# Patient Record
Sex: MALE | Race: WHITE | NOT HISPANIC OR LATINO | Employment: FULL TIME | URBAN - NONMETROPOLITAN AREA
[De-identification: names, ages, dates, MRNs, and addresses within clinical notes are randomized per-mention and may not be internally consistent; named-entity substitution may affect disease eponyms.]

---

## 2017-06-05 ENCOUNTER — OCCUPATIONAL MEDICINE (OUTPATIENT)
Dept: URGENT CARE | Facility: PHYSICIAN GROUP | Age: 62
End: 2017-06-05
Payer: COMMERCIAL

## 2017-06-05 VITALS
HEIGHT: 71 IN | WEIGHT: 188 LBS | OXYGEN SATURATION: 98 % | RESPIRATION RATE: 14 BRPM | BODY MASS INDEX: 26.32 KG/M2 | SYSTOLIC BLOOD PRESSURE: 128 MMHG | HEART RATE: 68 BPM | TEMPERATURE: 98.4 F | DIASTOLIC BLOOD PRESSURE: 78 MMHG

## 2017-06-05 DIAGNOSIS — S66.912A STRAIN OF LEFT WRIST, INITIAL ENCOUNTER: ICD-10-CM

## 2017-06-05 PROCEDURE — 29130 APPL FINGER SPLINT STATIC: CPT | Performed by: FAMILY MEDICINE

## 2017-06-05 PROCEDURE — 99204 OFFICE O/P NEW MOD 45 MIN: CPT | Performed by: FAMILY MEDICINE

## 2017-06-05 ASSESSMENT — PAIN SCALES - GENERAL: PAINLEVEL: 5=MODERATE PAIN

## 2017-06-05 NOTE — Clinical Note
"EMPLOYEE’S CLAIM FOR COMPENSATION/ REPORT OF INITIAL TREATMENT  FORM C-4    EMPLOYEE’S CLAIM - PROVIDE ALL INFORMATION REQUESTED   First Name  Wagner Last Name  Rhina Birthdate                    1955                Sex  male Claim Number   Home Address  [address] Age  62 y.o. 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 (if applicable)  lifting furniture    How did this injury or occupational disease occur? (Be specific an answer in detail. Use additional sheet if necessary)  lifting furniture after cleaning carpet to put protective blocks under legs   If you believe that you have an occupational disease, when did you first have knowledge of the disability and it relationship to your employment?   Witnesses to the Accident  no      Nature of Injury or Occupational Disease  Workers' Compensation  Part(s) of Body Injured or Affected  Wrist (L) and Hand (L), ,     I certify that the above is true and correct to the best of my knowledge and that I have provided this information in order to obtain the benefits of [place] and " [place]  I hereby authorize any physician, chiropractor, surgeon, practitioner, or other person, any hospital, including [facility] or [facility], any medical service organization, any insurance company, or other institution or organization to release to each other, any medical or other information, including benefits paid or payable, pertinent to this injury or disease, except information relative to diagnosis, treatment and/or counseling for AIDS, psychological conditions, alcohol or controlled substances, for which I must give specific authorization.  A Photostat of this authorization shall be as valid as the original.     Date  6/5/2017   [facility]   Employee’s Signature   THIS REPORT MUST BE COMPLETED AND MAILED WITHIN 3 WORKING DAYS OF TREATMENT   [facility]  Name of [facility]   Date  6/5/2017 Diagnosis  (S66.912A) Strain of left wrist, initial encounter Is there evidence the injured employee was under the influence of alcohol and/or another controlled substance at the time of accident?   Hour  4:23 PM Description of Injury or Disease  The encounter diagnosis was Strain of left wrist, initial encounter. No   Treatment  Strain of left wrist, initial encounter  rx voltaren gel, since OTCs not helpful  Work restrictions per D39  Thumb spica splint  F/U 5-7 days  Have you advised the patient to remain off work five days or more? No   X-Ray Findings      If Yes   From Date  To Date      From information given by the employee, together with medical evidence, can you directly connect this injury or occupational disease as job incurred?  Yes If No Full Duty  No Modified Duty  Yes   Is additional medical care by a physician indicated?  Yes If Modified Duty, Specify any Limitations / Restrictions  Restrictions per D39     Do you know of any previous injury or disease  "contributing to this condition or occupational disease?                            No   Date  6/5/2017 Print Doctor’s Name Damián Fitch M.D. I certify the employer’s copy of  this form was mailed on:   Address  [address] Insurer’s Use Only     [place] Zip  [address]    Provider’s Tax ID Number  [id number]  Telephone  Dept: [phone]        e-DAMIÁN Bird M.D.   e-Signature: Dr. Kyle Bishop, Medical Director Degree  MD        ORIGINAL-TREATING PHYSICIAN OR CHIROPRACTOR    PAGE 2-INSURER/TPA    PAGE 3-EMPLOYER    PAGE 4-EMPLOYEE             Form C-4 (rev10/07)              BRIEF DESCRIPTION OF RIGHTS AND BENEFITS  (Pursuant to [place] 616C.050)    Notice of Injury or Occupational Disease (Incident Report Form C-1): If an injury or occupational disease (OD) arises out of and in the  course of employment, you must provide written notice to your employer as soon as practicable, but no later than 7 days after the accident or  OD. Your employer shall maintain a sufficient supply of the required forms.    Claim for Compensation (Form C-4): If medical treatment is sought, the form C-4 is available at the place of initial treatment. A completed  \"Claim for Compensation\" (Form C-4) must be filed within 90 days after an accident or OD. The treating physician or chiropractor must,  within 3 working days after treatment, complete and mail to the employer, the employer's insurer and third-party , the Claim for  Compensation.    Medical Treatment: If you require medical treatment for your on-the-job injury or OD, you may be required to select a physician or  chiropractor from a list provided by your workers’ compensation insurer, if it has contracted with an Organization for Managed Care (MCO) or  Preferred Provider Organization (PPO) or providers of health care. If your employer has not entered into a contract with an MCO or PPO, you  may select a physician or " chiropractor from the Panel of Physicians and Chiropractors. Any medical costs related to your industrial injury or  OD will be paid by your insurer.    Temporary Total Disability (TTD): If your doctor has certified that you are unable to work for a period of at least 5 consecutive days, or 5  cumulative days in a 20-day period, or places restrictions on you that your employer does not accommodate, you may be entitled to TTD  compensation.    Temporary Partial Disability (TPD): If the wage you receive upon reemployment is less than the compensation for TTD to which you are  entitled, the insurer may be required to pay you TPD compensation to make up the difference. TPD can only be paid for a maximum of 24  months.    Permanent Partial Disability (PPD): When your medical condition is stable and there is an indication of a PPD as a result of your injury or  OD, within 30 days, your insurer must arrange for an evaluation by a rating physician or chiropractor to determine the degree of your PPD. The  amount of your PPD award depends on the date of injury, the results of the PPD evaluation and your age and wage.    Permanent Total Disability (PTD): If you are medically certified by a treating physician or chiropractor as permanently and totally disabled  and have been granted a PTD status by your insurer, you are entitled to receive monthly benefits not to exceed 66 2/3% of your average  monthly wage. The amount of your PTD payments is subject to reduction if you previously received a PPD award.    Vocational Rehabilitation Services: You may be eligible for vocational rehabilitation services if you are unable to return to the job due to a  permanent physical impairment or permanent restrictions as a result of your injury or occupational disease.    Transportation and Per Jarvis Reimbursement: You may be eligible for travel expenses and per jarvis associated with medical treatment.    Reopening: You may be able to reopen your  claim if your condition worsens after claim closure.    Appeal Process: If you disagree with a written determination issued by the insurer or the insurer does not respond to your request, you may  appeal to the Department of Administration, , by following the instructions contained in your determination letter. You must  appeal the determination within 70 days from the date of the determination letter at [address], [address], [place], [place]  [address], or [address], [place], [place] [address]. If you disagree with the  decision, you may appeal to the  Department of Administration, . You must file your appeal within 30 days from the date of the  decision  letter at [address], [address], [place], [place] [address], or [address], [place], [place] [address]. If you  disagree with a decision of an , you may file a petition for judicial review with the District Court. You must do so within 30  days of the Appeal Officer’s decision. You may be represented by an  at your own expense or you may contact the [facility] for possible  representation.    [place]  for Injured Workers (NAIW): If you disagree with a  decision, you may request that NAIW represent you  without charge at an  Hearing. For information regarding denial of benefits, you may contact the [facility] at: [address], [address], [place] [address], [phone], or [address], [place] [address], [phone]    To File a Complaint with the Division: If you wish to file a complaint with the  of the Division of Industrial Relations (DIR),  please contact the Workers’ Compensation Section, [address], [address], [place], [place] [address], telephone [phone], or  [address], [place]  [address], telephone [phone].    For assistance with Workers’ Compensation Issues: you may contact the Office of the Governor Consumer Health Assistance, [address] [place] [address], [address], [place] [address], Toll Free 1-998.425.9168, Web site: http://[employer].[facility].nv., E-mail  Za@[facility].[email or web address].                                                                                                                                                                                                                                   __________________________________________________________________                                                                   _____6/5/2017_____                Employee Name / Signature                                                                                                                                                       Date                                                                                                                                                                                                     D-2 (rev. 10/07)

## 2017-06-05 NOTE — Clinical Note
[address] [place] [address]  Phone: [phone] - Fax: [phone]        [facility] Progress Report and Disability Certification  Date of Service: 2017   No Show:  No  Date / Time of Next Visit: 2017   Claim Information   Patient Name: Wagner Lantigua  Claim Number:     Employer: [employer]  Date of Injury: 2017     Insurer / TPA: [employer]  ID / SSN:     Occupation: carpet tech  Diagnosis: The encounter diagnosis was Strain of left wrist, initial encounter.    Medical Information   Related to Industrial Injury? Yes    Subjective Complaints:        Left Wrist Injury   DOI: .   he was lifting a couch with left hand and suddenly felt pain afterward in the left wrist.   Pain is present over lateral wrist and forearm.    The injury mechanism was a fall. The pain is present in the right wrist. The quality of the pain is described as aching and intermittent and pain increased with use of the left hand, especially grasping.   Pertinent negatives include no chest pain, muscle weakness, numbness or tingling.   Pt has tried tylenol, motrin for the symptoms - no improvement.    Objective Findings: Musculoskeletal:        Left wrist: pt exhibits tenderness and minor swelling, lateral wrist.   normal range of motion and no crepitus.        Left hand: Normal sensation noted. Normal strength noted.   Pre-Existing Condition(s):     Assessment:   Initial Visit    Status: Additional Care Required  Permanent Disability:No    Plan: Medication    Diagnostics:      Comments:       Disability Information   Status: Released to Restricted Duty    From:  2017  Through: 2017 Restrictions are: Temporary   Physical Restrictions   Sitting:    Standing:    Stooping:    Bending:      Squatting:    Walking:    Climbing:    Pushing:      Pullin hrs/day  Comments:left hand Other:    Reaching Above Shoulder (L):   Reaching Above  Shoulder (R):       Reaching Below Shoulder (L):    Reaching Below Shoulder (R):      Not to exceed Weight Limits   Carrying(hrs):   Weight Limit(lb): < or = to 10 pounds Lifting(hrs):   Weight  Limit(lb): < or = to 10 pounds   Comments: Strain of left wrist, initial encounter  rx voltaren gel, since OTCs not helpful  Work restrictions per D39  Thumb spica splint    F/U 5-7 days    Repetitive Actions   Hands: i.e. Fine Manipulations from Grasping: < or = to 1 hr/day  Comments:left hand   Feet: i.e. Operating Foot Controls:     Driving / Operate Machinery:     Physician Name: Damián Fitch M.D. Physician Signature: DAMIÁN Quintero M.D. e-Signature: Dr. Kyle Bishop, Medical Director   Clinic Name / Location: [address] [address] Clinic Phone Number: Dept: [phone]   Appointment Time: 4:00 Pm Visit Start Time: 4:23 PM   Check-In Time:  4:04 Pm Visit Discharge Time:  507PM   Original-Treating Physician or Chiropractor    Page 2-Insurer/TPA    Page 3-Employer    Page 4-Employee

## 2017-06-05 NOTE — PROGRESS NOTES
Subjective:      Chief Complaint   Patient presents with   • Wrist Injury     left wrist injury                Left Wrist Injury   DOI: 5/22.   he was lifting a couch with left hand and suddenly felt pain afterward in the left wrist.   Pain is present over lateral wrist and forearm.    The injury mechanism was a fall. The pain is present in the right wrist. The quality of the pain is described as aching and intermittent and pain increased with use of the left hand, especially grasping.   Pertinent negatives include no chest pain, muscle weakness, numbness or tingling.   Pt has tried tylenol, motrin for the symptoms - no improvement.     Social History   Substance Use Topics   • Smoking status: Light Tobacco Smoker -- 0.25 packs/day     Types: Cigars   • Smokeless tobacco: Never Used   • Alcohol Use: 0.6 oz/week     1 Shots of liquor per week         Past Medical History   Diagnosis Date   • Thyroid disease          Current Outpatient Prescriptions on File Prior to Visit   Medication Sig Dispense Refill   • levothyroxine (SYNTHROID) 125 MCG TABS Take 125 mcg by mouth every day.       No current facility-administered medications on file prior to visit.       Family history was reviewed and not pertinent       Review of Systems   Constitutional: Negative for fever, chills and weight loss.   HENT - denies cough, ear pain, congestion, sore throat  Eyes: denies vision changes   Respiratory: Negative for cough and wheezing.    Cardiovascular: Negative for chest pain.   Gastrointestinal:  No abdominal pain,  nausea, vomiting, diarrhea.  Negative for  blood in stool.    - no discharge, dysuria, frequency.      Neurological: Negative for dizziness and headaches.   musculoskeletal - denies myalgias, calf pain  Psych - denies anxiety/depression/mood changes.  Skin: no itching or rash  All other systems reviewed and are negative.          Objective:     Blood pressure 128/78, pulse 68, temperature 36.9 °C (98.4 °F), resp. rate  "14, height 1.803 m (5' 10.98\"), weight 85.276 kg (188 lb), SpO2 98 %.    Physical Exam   Constitutional: pt is oriented to person, place, and time. Pt appears well-developed and well-nourished. No distress.   HENT:   Head: Normocephalic and atraumatic.   Eyes: Conjunctivae are normal.   Cardiovascular: Normal rate.    Pulmonary/Chest: Effort normal.   Musculoskeletal:        Left wrist: pt exhibits tenderness and minor swelling, lateral wrist.   normal range of motion and no crepitus.        Left hand: Normal sensation noted. Normal strength noted.     Neurological: pt is alert and oriented to person, place, and time.   Skin: Skin is warm. Pt is not diaphoretic. No erythema.   Nursing note and vitals reviewed.              Assessment/Plan:       1. Strain of left wrist, initial encounter  rx voltaren gel, since OTCs not helpful  Thumb spica splint  Work restrictions per D39  F/U 5-7 days      "

## 2017-06-05 NOTE — Clinical Note
"EMPLOYEE’S CLAIM FOR COMPENSATION/ REPORT OF INITIAL TREATMENT  FORM C-4    EMPLOYEE’S CLAIM - PROVIDE ALL INFORMATION REQUESTED   First Name  Wagner Last Name  Rhina Birthdate                    1955                Sex  male Claim Number   Home Address  [address] Age  62 y.o. 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 (if applicable)  lifting furniture    How did this injury or occupational disease occur? (Be specific an answer in detail. Use additional sheet if necessary)  lifting furniture after cleaning carpet to put protective blocks under legs   If you believe that you have an occupational disease, when did you first have knowledge of the disability and it relationship to your employment?   Witnesses to the Accident  no      Nature of Injury or Occupational Disease  Workers' Compensation  Part(s) of Body Injured or Affected  Wrist (L) and Hand (L), ,     I certify that the above is true and correct to the best of my knowledge and that I have provided this information in order to obtain the benefits of " [place]  I hereby authorize any physician, chiropractor, surgeon, practitioner, or other person, any hospital, including [facility] or [facility], any medical service organization, any insurance company, or other institution or organization to release to each other, any medical or other information, including benefits paid or payable, pertinent to this injury or disease, except information relative to diagnosis, treatment and/or counseling for AIDS, psychological conditions, alcohol or controlled substances, for which I must give specific authorization.  A Photostat of this authorization shall be as valid as the original.     Date   Place   Employee’s Signature   THIS REPORT MUST BE COMPLETED AND MAILED WITHIN 3 WORKING DAYS OF TREATMENT   Place  [facility]  Name of [facility]   Date  6/5/2017 Diagnosis  (S66.912A) Strain of left wrist, initial encounter Is there evidence the injured employee was under the influence of alcohol and/or another controlled substance at the time of accident?   Hour  4:23 PM Description of Injury or Disease  The encounter diagnosis was Strain of left wrist, initial encounter. No   Treatment  Strain of left wrist, initial encounter  rx voltaren gel, since OTCs not helpful  Work restrictions per D39  F/U 5-7 days  Have you advised the patient to remain off work five days or more? No   X-Ray Findings      If Yes   From Date  To Date      From information given by the employee, together with medical evidence, can you directly connect this injury or occupational disease as job incurred?  Yes If No Full Duty  No Modified Duty  Yes   Is additional medical care by a physician indicated?  Yes If Modified Duty, Specify any Limitations / Restrictions  Restrictions per D39     Do you know of any previous injury or disease contributing to this condition  "or occupational disease?                            No   Date  6/5/2017 Print Doctor’s Name Damián Fitch M.D. I certify the employer’s copy of  this form was mailed on:   Address  [address] Insurer’s Use Only     [place] [facility] [facility] Zip  [address]    Provider’s Tax ID Number  [id number]  Telephone  Dept: [phone]        e-DAMIÁN Brid M.D.   e-Signature: Dr. Kyle Bishop, Medical Director Degree  MD TSAI-TREATING PHYSICIAN OR CHIROPRACTOR    PAGE 2-INSURER/TPA    PAGE 3-EMPLOYER    PAGE 4-EMPLOYEE             Form C-4 (rev10/07)              BRIEF DESCRIPTION OF RIGHTS AND BENEFITS  (Pursuant to [place] 616C.050)    Notice of Injury or Occupational Disease (Incident Report Form C-1): If an injury or occupational disease (OD) arises out of and in the  course of employment, you must provide written notice to your employer as soon as practicable, but no later than 7 days after the accident or  OD. Your employer shall maintain a sufficient supply of the required forms.    Claim for Compensation (Form C-4): If medical treatment is sought, the form C-4 is available at the place of initial treatment. A completed  \"Claim for Compensation\" (Form C-4) must be filed within 90 days after an accident or OD. The treating physician or chiropractor must,  within 3 working days after treatment, complete and mail to the employer, the employer's insurer and third-party , the Claim for  Compensation.    Medical Treatment: If you require medical treatment for your on-the-job injury or OD, you may be required to select a physician or  chiropractor from a list provided by your workers’ compensation insurer, if it has contracted with an Organization for Managed Care (MCO) or  Preferred Provider Organization (PPO) or providers of health care. If your employer has not entered into a contract with an MCO or PPO, you  may select a physician or chiropractor from the Panel of " Physicians and Chiropractors. Any medical costs related to your industrial injury or  OD will be paid by your insurer.    Temporary Total Disability (TTD): If your doctor has certified that you are unable to work for a period of at least 5 consecutive days, or 5  cumulative days in a 20-day period, or places restrictions on you that your employer does not accommodate, you may be entitled to TTD  compensation.    Temporary Partial Disability (TPD): If the wage you receive upon reemployment is less than the compensation for TTD to which you are  entitled, the insurer may be required to pay you TPD compensation to make up the difference. TPD can only be paid for a maximum of 24  months.    Permanent Partial Disability (PPD): When your medical condition is stable and there is an indication of a PPD as a result of your injury or  OD, within 30 days, your insurer must arrange for an evaluation by a rating physician or chiropractor to determine the degree of your PPD. The  amount of your PPD award depends on the date of injury, the results of the PPD evaluation and your age and wage.    Permanent Total Disability (PTD): If you are medically certified by a treating physician or chiropractor as permanently and totally disabled  and have been granted a PTD status by your insurer, you are entitled to receive monthly benefits not to exceed 66 2/3% of your average  monthly wage. The amount of your PTD payments is subject to reduction if you previously received a PPD award.    Vocational Rehabilitation Services: You may be eligible for vocational rehabilitation services if you are unable to return to the job due to a  permanent physical impairment or permanent restrictions as a result of your injury or occupational disease.    Transportation and Per Jarvis Reimbursement: You may be eligible for travel expenses and per jarvis associated with medical treatment.    Reopening: You may be able to reopen your claim if your condition  worsens after claim closure.    Appeal Process: If you disagree with a written determination issued by the insurer or the insurer does not respond to your request, you may  appeal to the Department of Administration, , by following the instructions contained in your determination letter. You must  appeal the determination within 70 days from the date of the determination letter at [address], [address], [place], [place]  [address], or [address], [place], [place] [address]. If you disagree with the  decision, you may appeal to the  Department of Administration, . You must file your appeal within 30 days from the date of the  decision  letter at [address], [address], [place], [place] [address], or [address], [address], [place], [place] [address]. If you  disagree with a decision of an , you may file a petition for judicial review with the District Court. You must do so within 30  days of the Appeal Officer’s decision. You may be represented by an  at your own expense or you may contact the [facility] for possible  representation.    [place]  for Injured Workers (NAIW): If you disagree with a  decision, you may request that NAIW represent you  without charge at an  Hearing. For information regarding denial of benefits, you may contact the [facility] at: [address], [address], [place] [address], [phone], or [address], [place] [address], [phone]    To File a Complaint with the Division: If you wish to file a complaint with the  of the Division of Industrial Relations (DIR),  please contact the Workers’ Compensation Section, [address], [address], [place], [place] [address], telephone [phone]) [phone], or  [address], [place] [address], telephone [phone])  [phone].    For assistance with Workers’ Compensation Issues: you may contact the Office of the Governor Consumer Health Assistance, [address] [place] [address], [address], [place] [address], Toll Free 1-150.376.3325, Web site: http://govcha.[facility].nv., E-mail  Za@[facility].nv.                                                                                                                                                                                                                                   __________________________________________________________________                                                                   _________________                Employee Name / Signature                                                                                                                                                       Date                                                                                                                                                                                                     D-2 (rev. 10/07)

## 2017-06-05 NOTE — Clinical Note
[address] [place] [address]  Phone: [phone] - Fax: [phone]        [facility] Progress Report and Disability Certification  Date of Service: 2017   No Show:  No  Date / Time of Next Visit: 2017   Claim Information   Patient Name: Wagner Lantigua  Claim Number:     Employer: [employer] *** Date of Injury: 2017     Insurer / TPA: Misc Workers Comp *** ID / SSN:     Occupation: carpet tech *** Diagnosis: The encounter diagnosis was Strain of left wrist, initial encounter.    Medical Information   Related to Industrial Injury? Yes ***   Subjective Complaints:        Left Wrist Injury   DOI: .   he was lifting a couch with left hand and suddenly felt pain afterward in the left wrist.   Pain is present over lateral wrist and forearm.    The injury mechanism was a fall. The pain is present in the right wrist. The quality of the pain is described as aching and intermittent and pain increased with use of the left hand, especially grasping.   Pertinent negatives include no chest pain, muscle weakness, numbness or tingling.   Pt has tried tylenol, motrin for the symptoms - no improvement.    Objective Findings: Musculoskeletal:        Left wrist: pt exhibits tenderness and minor swelling, lateral wrist.   normal range of motion and no crepitus.        Left hand: Normal sensation noted. Normal strength noted.   Pre-Existing Condition(s):     Assessment:   Initial Visit    Status: Additional Care Required  Permanent Disability:No    Plan: Medication    Diagnostics:      Comments:       Disability Information   Status: Released to Restricted Duty    From:  2017  Through: 2017 Restrictions are: Temporary   Physical Restrictions   Sitting:    Standing:    Stooping:    Bending:      Squatting:    Walking:    Climbing:    Pushing:      Pullin hrs/day  Comments:left hand Other:    Reaching Above Shoulder (L):    Reaching Above Shoulder (R):       Reaching Below Shoulder (L):    Reaching Below Shoulder (R):      Not to exceed Weight Limits   Carrying(hrs):   Weight Limit(lb): < or = to 10 pounds Lifting(hrs):   Weight  Limit(lb): < or = to 10 pounds   Comments: Strain of left wrist, initial encounter  rx voltaren gel, since OTCs not helpful  Work restrictions per D39  F/U 5-7 days    Repetitive Actions   Hands: i.e. Fine Manipulations from Grasping: < or = to 1 hr/day  Comments:left hand   Feet: i.e. Operating Foot Controls:     Driving / Operate Machinery:     Physician Name: Damián Fitch M.D. Physician Signature: DAMIÁN Quintero M.D. e-Signature: Dr. Kyle Bishop, Medical Director   Clinic Name / Location: [address] [address] Clinic Phone Number: Dept: [phone]   Appointment Time: 4:00 Pm Visit Start Time: 4:23 PM   Check-In Time:  4:04 Pm Visit Discharge Time:  ***   Original-Treating Physician or Chiropractor    Page 2-Insurer/TPA    Page 3-Employer    Page 4-Employee

## 2017-06-12 ENCOUNTER — OCCUPATIONAL MEDICINE (OUTPATIENT)
Dept: URGENT CARE | Facility: PHYSICIAN GROUP | Age: 62
End: 2017-06-12
Payer: COMMERCIAL

## 2017-06-12 VITALS
SYSTOLIC BLOOD PRESSURE: 132 MMHG | WEIGHT: 193 LBS | RESPIRATION RATE: 16 BRPM | OXYGEN SATURATION: 97 % | DIASTOLIC BLOOD PRESSURE: 80 MMHG | TEMPERATURE: 97.3 F | BODY MASS INDEX: 26.93 KG/M2 | HEART RATE: 86 BPM

## 2017-06-12 DIAGNOSIS — S66.912D STRAIN OF LEFT WRIST, SUBSEQUENT ENCOUNTER: ICD-10-CM

## 2017-06-12 PROCEDURE — 99213 OFFICE O/P EST LOW 20 MIN: CPT | Mod: 29 | Performed by: PHYSICIAN ASSISTANT

## 2017-06-12 NOTE — Clinical Note
[address] [place] [address]  Phone: [phone] - Fax: [phone]        [facility] Progress Report and Disability Certification  Date of Service: 6/12/2017   No Show:  No  Date / Time of Next Visit: 6/19/2017   Claim Information   Patient Name: Wagner Lantigua  Claim Number:     Employer: [employer]  Date of Injury: 5/22/2017     Insurer / TPA: Luis E  ID / SSN:     Occupation: carpet tech  Diagnosis: The encounter diagnosis was Strain of left wrist, subsequent encounter.    Medical Information   Related to Industrial Injury? Yes    Subjective Complaints:   Patient is a 62 y.o. male who presents today for Worker's Compensation follow-up. DOI: 6/5/17. Patient was lifting a couch with his left hand when he had immediate pain in his left wrist. Patient has been wearing a thumb spica since his last visit here. After removing the spica, he complains of pain and stiffness in the left thumb, radiating to the distal radius. He has worsening pain with range of motion of his thumb, especially opposition. He denies distal paresthesias. He does report some improvement in his symptoms. He has not been taking any over-the-counter medication. He denies any history of injury to this wrist and hand prior to 6/5/17. He has no other places of appointment. Patient would like to try to return to work without restrictions.     Objective Findings: Blood pressure 132/80, pulse 86, temperature 36.3 °C (97.3 °F), resp. rate 16, weight 87.544 kg (193 lb), SpO2 97 %.  General: Normal appearing. No distress.  HEENT: Head is grossly normal.  Pulmonary: No respiratory distress.  Cardiovascular: Cap refill is less than 2 seconds in the left hand.  Neurologic: No sensory deficit noted.  Extremities: No localized tenderness noted. No soft tissue swelling, erythema, or skin changes noted. Pain with left thumb opposition, specifically over the first metacarpal  extending into the distal radius.  Skin: No obvious lesions.  Psych: Normal mood. Alert and oriented x3. Judgment and insight is normal.   Pre-Existing Condition(s):     Assessment:   Condition Improved    Status: Additional Care Required  Permanent Disability:No    Plan: Medication  Comments:OTC meds only    Diagnostics:      Comments:  PLAN:  May return to work without restrictions. Recommend trying an over-the-counter thumb spica for comfort. Discussed appropriate dosing of ibuprofen and acetaminophen. Follow up in one week, sooner for any changes in symptoms.    Disability Information   Status: Released to Full Duty    From:  6/12/2017  Through: 6/19/2017 Restrictions are:     Physical Restrictions   Sitting:    Standing:    Stooping:    Bending:      Squatting:    Walking:    Climbing:    Pushing:      Pulling:    Other:    Reaching Above Shoulder (L):   Reaching Above Shoulder (R):       Reaching Below Shoulder (L):    Reaching Below Shoulder (R):      Not to exceed Weight Limits   Carrying(hrs):   Weight Limit(lb):   Lifting(hrs):   Weight  Limit(lb):     Comments:      Repetitive Actions   Hands: i.e. Fine Manipulations from Grasping:     Feet: i.e. Operating Foot Controls:     Driving / Operate Machinery:     Physician Name: Marily Sullivan PA-C Physician Signature: MARILY Gastelum PA-C e-Signature: Dr. Kyle Bishop, Medical Director   Clinic Name / Location: [address] [address] Clinic Phone Number: Dept: [phone]   Appointment Time: 9:30 Am Visit Start Time: 10:12 AM   Check-In Time:  9:25 Am Visit Discharge Time:     Original-Treating Physician or Chiropractor    Page 2-Insurer/TPA    Page 3-Employer    Page 4-Employee

## 2017-06-12 NOTE — MR AVS SNAPSHOT
Wagner Lantigua   2017 9:30 AM   Occupational Medicine   MRN: [id number]    Department:  [facility]   Dept Phone:  [phone]    Description:  Male : 1955   Provider:  Angelina Sullivan PA-C           Reason for Visit     Follow-Up L/ wrist injury, WC FV      Allergies as of 2017     Allergen Noted Reactions    Cortizone 2014         You were diagnosed with     Strain of left wrist, subsequent encounter   [532519]         Vital Signs     Blood Pressure Pulse Temperature Respirations Weight Oxygen Saturation    132/80 mmHg 86 36.3 °C (97.3 °F) 16 87.544 kg (193 lb) 97%    Smoking Status                   Light Tobacco Smoker           Basic Information     Date Of Birth Sex Race Ethnicity Preferred Language    1955 Male White Non- English      Health Maintenance        Date Due Completion Dates    IMM DTaP/Tdap/Td Vaccine (1 - Tdap) 1974 ---    COLONOSCOPY 2005 ---    IMM ZOSTER VACCINE 2015 ---            Current Immunizations     No immunizations on file.      Below and/or attached are the medications your provider expects you to take. Review all of your home medications and newly ordered medications with your provider and/or pharmacist. Follow medication instructions as directed by your provider and/or pharmacist. Please keep your medication list with you and share with your provider. Update the information when medications are discontinued, doses are changed, or new medications (including over-the-counter products) are added; and carry medication information at all times in the event of emergency situations     Allergies:  CORTIZONE - (reactions not documented)               Medications  Valid as of: 2017 - 10:46 AM    Generic Name Brand Name Tablet Size Instructions for use    Diclofenac Sodium (Gel) Diclofenac Sodium 1 % Apply 4 g to skin as directed 3 times a day as needed.        Levothyroxine Sodium (Tab) SYNTHROID 125 MCG Take 125 mcg by  mouth every day.        .                 Medicines prescribed today were sent to:     [id number] - [place], [place] - [address] [facility] [address] [facility] [address] [facility]    [address] [facility] [address] [place] [address]    Phone: [phone] Fax: [phone]    Open 24 Hours?: No      Medication refill instructions:       If your prescription bottle indicates you have medication refills left, it is not necessary to call your provider’s office. Please contact your pharmacy and they will refill your medication.    If your prescription bottle indicates you do not have any refills left, you may request refills at any time through one of the following ways: The online Algorego system (except Urgent Care), by calling your provider’s office, or by asking your pharmacy to contact your provider’s office with a refill request. Medication refills are processed only during regular business hours and may not be available until the next business day. Your provider may request additional information or to have a follow-up visit with you prior to refilling your medication.   *Please Note: Medication refills are assigned a new Rx number when refilled electronically. Your pharmacy may indicate that no refills were authorized even though a new prescription for the same medication is available at the pharmacy. Please request the medicine by name with the pharmacy before contacting your provider for a refill.           Algorego Access Code: [email or web address]  Expires: 7/12/2017 10:46 AM    Your email address is not on file at [facility].  Email Addresses are required for you to sign up for Algorego, please contact [phone] to verify your personal information and to provide your email address prior to attempting to register for Algorego.    Wagner Lantigua  [facility] [place] [address]    Algorego  A secure, online tool to manage your health information     [facility]’s Algorego® is a secure, online tool that  connects you to your personalized health information from the privacy of your home -- day or night - making it very easy for you to manage your healthcare. Once the activation process is completed, you can even access your medical information using the [employer] iva, which is available for free in the Apple Iva store or Google Play store.     To learn more about [employer], visit [email or web address]    There are two levels of access available (as shown below):   My Chart Features  [facility]  Urgent  Care Non-[facility]   Email your healthcare team securely and privately 24/7 X X X    Manage appointments: schedule your next appointment; view details of past/upcoming appointments X      Request prescription refills. X      View recent personal medical records, including lab and immunizations X X X X   View health record, including health history, allergies, medications X X X X   Read reports about your outpatient visits, procedures, consult and ER notes X X X X   See your discharge summary, which is a recap of your hospital and/or ER visit that includes your diagnosis, lab results, and care plan X X  X     How to register for [employer]:  Once your e-mail address has been verified, follow the following steps to sign up for [employer].     1. Go to  [email or web address]  2. Click on the Sign Up Now box, which takes you to the New Member Sign Up page. You will need to provide the following information:  a. Enter your [employer] Access Code exactly as it appears at the top of this page. (You will not need to use this code after you’ve completed the sign-up process. If you do not sign up before the expiration date, you must request a new code.)   b. Enter your date of birth.   c. Enter your home email address.   d. Click Submit, and follow the next screen’s instructions.  3. Create a [employer] ID. This will be your [employer] login ID and cannot be changed, so think of one  that is secure and easy to remember.  4. Create a Finanzchef[employer] password. You can change your password at any time.  5. Enter your Password Reset Question and Answer. This can be used at a later time if you forget your password.   6. Enter your e-mail address. This allows you to receive e-mail notifications when new information is available in Finanzchef24.  7. Click Sign Up. You can now view your health information.    For assistance activating your Finanzchef24 account, call [phone]         Quit Tobacco Information     Do you want to quit using tobacco?    Quitting tobacco decreases risks of cancer, heart and lung disease, increases life expectancy, improves sense of taste and smell, and increases spending money, among other benefits.    If you are thinking about quitting, we can help.  • [facility] Quit Tobacco Program: 669.507.6030  o Program occurs weekly for four weeks and includes pharmacist consultation on products to support quitting smoking or chewing tobacco. A provider referral is needed for pharmacist consultation.  • Tobacco Users Help Hotline: 4-800QUIT-NOW (051-4378) or https://[place].quitlogix.org/  o Free, confidential telephone and online coaching for [place] residents. Sessions are designed on a schedule that is convenient for you. Eligible clients receive free nicotine replacement therapy.  • Nationally: www.smokefree.gov  o Information and professional assistance to support both immediate and long-term needs as you become, and remain, a non-smoker. Smokefree.gov allows you to choose the help that best fits your needs.

## 2017-06-12 NOTE — PROGRESS NOTES
Chief Complaint   Patient presents with   • Follow-Up     L/ wrist injury, WC FV       HISTORY OF PRESENT ILLNESS: Patient is a 62 y.o. male who presents today for Worker's Compensation follow-up. DOI: 6/5/17. Patient was lifting a couch with his left hand when he had immediate pain in his left wrist. Patient has been wearing a thumb spica since his last visit here. After removing the spica, he complains of pain and stiffness in the left thumb, radiating to the distal radius. He has worsening pain with range of motion of his thumb, especially opposition. He denies distal paresthesias. He does report some improvement in his symptoms. He has not been taking any over-the-counter medication. He denies any history of injury to this wrist and hand prior to 6/5/17. He has no other places of appointment. Patient would like to try to return to work without restrictions.    There are no active problems to display for this patient.      Allergies:Cortizone    Current Outpatient Prescriptions Ordered in [facility]   Medication Sig Dispense Refill   • Diclofenac Sodium (VOLTAREN) 1 % Gel Apply 4 g to skin as directed 3 times a day as needed. 1 Tube 0   • levothyroxine (SYNTHROID) 125 MCG TABS Take 125 mcg by mouth every day.       No current [facility]-ordered facility-administered medications on file.       Past Medical History   Diagnosis Date   • Thyroid disease        Social History   Substance Use Topics   • Smoking status: Light Tobacco Smoker -- 0.25 packs/day     Types: Cigars   • Smokeless tobacco: Never Used   • Alcohol Use: 0.6 oz/week     1 Shots of liquor per week       No family status information on file.   History reviewed. No pertinent family history.    ROS:   Review of Systems   Constitutional: Negative for fever, chills, weight loss and malaise/fatigue.     Exam:  Blood pressure 132/80, pulse 86, temperature 36.3 °C (97.3 °F), resp. rate 16, weight 87.544 kg (193 lb), SpO2 97 %.  General: Normal appearing. No  distress.  HEENT: Head is grossly normal.  Pulmonary: No respiratory distress.  Cardiovascular: Cap refill is less than 2 seconds in the left hand.  Neurologic: No sensory deficit noted.  Extremities: No localized tenderness noted. No soft tissue swelling, erythema, or skin changes noted. Pain with left thumb opposition, specifically over the first metacarpal extending into the distal radius.  Skin: No obvious lesions.  Psych: Normal mood. Alert and oriented x3. Judgment and insight is normal.    Assessment/Plan:   May return to work without restrictions. Recommend trying an over-the-counter thumb spica for comfort. Discussed appropriate dosing of ibuprofen and acetaminophen. Follow up in one week, sooner for any changes in symptoms.  1. Strain of left wrist, subsequent encounter

## 2017-06-20 ENCOUNTER — OCCUPATIONAL MEDICINE (OUTPATIENT)
Dept: URGENT CARE | Facility: PHYSICIAN GROUP | Age: 62
End: 2017-06-20
Payer: COMMERCIAL

## 2017-06-20 VITALS
HEIGHT: 71 IN | SYSTOLIC BLOOD PRESSURE: 112 MMHG | WEIGHT: 189 LBS | TEMPERATURE: 97.7 F | OXYGEN SATURATION: 97 % | RESPIRATION RATE: 16 BRPM | BODY MASS INDEX: 26.46 KG/M2 | DIASTOLIC BLOOD PRESSURE: 68 MMHG | HEART RATE: 72 BPM

## 2017-06-20 DIAGNOSIS — S66.912D STRAIN OF LEFT WRIST, SUBSEQUENT ENCOUNTER: ICD-10-CM

## 2017-06-20 DIAGNOSIS — M65.4 DE QUERVAIN'S TENOSYNOVITIS, LEFT: ICD-10-CM

## 2017-06-20 PROCEDURE — 99214 OFFICE O/P EST MOD 30 MIN: CPT | Mod: 29 | Performed by: PHYSICIAN ASSISTANT

## 2017-06-20 ASSESSMENT — PAIN SCALES - GENERAL: PAINLEVEL: 4=SLIGHT-MODERATE PAIN

## 2017-06-20 NOTE — PROGRESS NOTES
Chief Complaint   Patient presents with   • Wrist Injury     WC Follow Up       HISTORY OF PRESENT ILLNESS: Patient is a 62 y.o. male who presents today because he is following up with work comp injury.    DOI: 6/5/17. Patient was lifting a couch with his left hand when he had immediate pain in his left wrist. This is his third visit to urgent care. He originally was wearing a thumb spica splint. After trying over-the-counter anti-inflammatories, was prescribed alternatives first visit. He was seen a week ago and was put back to work without restrictions By his request. Since that time, he continues to have pain that radiates from the base of his left thumb, medially towards the midportion of his left forearm on the lateral aspect. Denies any distal paresthesias. He has continued to use the thumb spica splint, ice, anti-inflammatory    There are no active problems to display for this patient.      Allergies:Cortizone    Current Outpatient Prescriptions Ordered in [facility]   Medication Sig Dispense Refill   • Diclofenac Sodium (VOLTAREN) 1 % Gel Apply 4 g to skin as directed 3 times a day as needed. 1 Tube 0   • levothyroxine (SYNTHROID) 125 MCG TABS Take 125 mcg by mouth every day.       No current [facility]-ordered facility-administered medications on file.       Past Medical History   Diagnosis Date   • Thyroid disease        Social History   Substance Use Topics   • Smoking status: Light Tobacco Smoker -- 0.25 packs/day     Types: Cigars   • Smokeless tobacco: Never Used   • Alcohol Use: 0.6 oz/week     1 Shots of liquor per week       No family status information on file.   No family history on file.    ROS:  Review of Systems   Constitutional: Negative for fever, chills, weight loss and malaise/fatigue.   HENT: Negative for ear pain, nosebleeds, congestion, sore throat and neck pain.    Eyes: Negative for blurred vision.   Respiratory: Negative for cough, sputum production, shortness of breath and wheezing.   "  Cardiovascular: Negative for chest pain, palpitations, orthopnea and leg swelling.       Exam:  Blood pressure 112/68, pulse 72, temperature 36.5 °C (97.7 °F), resp. rate 16, height 1.803 m (5' 10.98\"), weight 85.73 kg (189 lb), SpO2 97 %.  General:  Well nourished, well developed male in NAD  Head:Normocephalic, atraumatic  Eyes: PERRLA, EOM within normal limits, no conjunctival injection, no scleral icterus, visual fields and acuity grossly intact.  Extremities: no clubbing, cyanosis, or edema. Left wrist and hand are without any visual deformity, erythema, edema or ecchymosis. He has reduced range of motion with opposition of his thumb, positive Finkelstein's.    Please note that this dictation was created using voice recognition software. I have made every reasonable attempt to correct obvious errors, but I expect that there are errors of grammar and possibly content that I did not discover before finalizing the note.    Assessment/Plan:  1. Strain of left wrist, subsequent encounter     2. De Quervain's tenosynovitis, left  REFERRAL TO PHYSICAL THERAPY Reason for Therapy: Eval/Treat/Report       Continue splint as needed, continue ice, over-the-counter anti-inflammatory.      "

## 2017-06-20 NOTE — Clinical Note
[address] [place] [address]  Phone: [phone] - Fax: [phone]        [facility] Progress Report and Disability Certification  Date of Service: 6/20/2017   No Show:  No  Date / Time of Next Visit: 7/4/2017   Claim Information   Patient Name: Wagner Lantigua  Claim Number:     Employer: [employer]  Date of Injury: 5/22/2017     Insurer / TPA: Luis E  ID / SSN:     Occupation: carpet tech  Diagnosis: Diagnoses of Strain of left wrist, subsequent encounter and De Quervain's tenosynovitis, left were pertinent to this visit.    Medical Information   Related to Industrial Injury? Yes    Subjective Complaints:  Continued left wrist/thumb pain   Objective Findings:  Left wrist and hand are without any visual deformity, erythema, edema or ecchymosis. He has reduced range of motion with opposition of his thumb, positive Finkelstein's.     Pre-Existing Condition(s):     Assessment:   Condition Same    Status: Additional Care Required  Comments:physical therapy, follow-up in 2 weeks  Permanent Disability:No    Plan: MedicationPT  Comments:over-the-counter anti-inflammatory    Diagnostics:      Comments:       Disability Information   Status: Released to Restricted Duty    From:  6/20/2017  Through: 7/4/2017 Restrictions are: Temporary   Physical Restrictions   Sitting:    Standing:    Stooping:    Bending:      Squatting:    Walking:    Climbing:    Pushing:  < or = to 6 hrs/day   Pulling:  < or = to 6 hrs/day Other:    Reaching Above Shoulder (L):   Reaching Above Shoulder (R):       Reaching Below Shoulder (L):    Reaching Below Shoulder (R):      Not to exceed Weight Limits   Carrying(hrs): 6 Weight Limit(lb): < or = to 25 pounds  Comments:left hand and arm Lifting(hrs): 6 Weight  Limit(lb): < or = to 25 pounds  Comments:left hand and arm   Comments:      Repetitive Actions   Hands: i.e. Fine Manipulations from Grasping: < or = to 2  hrs/day   Feet: i.e. Operating Foot Controls:     Driving / Operate Machinery:     Physician Name: Martha Quintero PA-C Physician Signature: MARTHA Carreon PA-C e-Signature: Dr. Kyle Bishop, Medical Director   Clinic Name / Location: [address] [address] Clinic Phone Number: Dept: [phone]   Appointment Time: 9:30 Am Visit Start Time: 9:29 AM   Check-In Time:  9:17 Am Visit Discharge Time: 9:48 AM   Original-Treating Physician or Chiropractor    Page 2-Insurer/TPA    Page 3-Employer    Page 4-Employee

## 2017-06-20 NOTE — MR AVS SNAPSHOT
"        Wagner Lantigua   2017 9:30 AM   Occupational Medicine   MRN: [id number]    Department:  [facility]   Dept Phone:  [phone]    Description:  Male : 1955   Provider:  Vahid Quintero PA-C           Reason for Visit     Wrist Injury WC Follow Up      Allergies as of 2017     Allergen Noted Reactions    Cortizone 2014         You were diagnosed with     Strain of left wrist, subsequent encounter   [[id number]]       De Quervain's tenosynovitis, left   [[id number]]         Vital Signs     Blood Pressure Pulse Temperature Respirations Height Weight    112/68 mmHg 72 36.5 °C (97.7 °F) 16 1.803 m (5' 10.98\") 85.73 kg (189 lb)    Body Mass Index Oxygen Saturation Smoking Status             26.37 kg/m2 97% Light Tobacco Smoker         Basic Information     Date Of Birth Sex Race Ethnicity Preferred Language    1955 Male White Non- English      Your appointments     2017  9:30 AM   Workers Compensation with [facility]    [address] [place] [id number] [phone]              Health Maintenance        Date Due Completion Dates    IMM DTaP/Tdap/Td Vaccine (1 - Tdap) 1974 ---    COLONOSCOPY 2005 ---    IMM ZOSTER VACCINE 2015 ---            Current Immunizations     No immunizations on file.      Below and/or attached are the medications your provider expects you to take. Review all of your home medications and newly ordered medications with your provider and/or pharmacist. Follow medication instructions as directed by your provider and/or pharmacist. Please keep your medication list with you and share with your provider. Update the information when medications are discontinued, doses are changed, or new medications (including over-the-counter products) are added; and carry medication information at all times in the event of emergency situations     Allergies:  CORTIZONE - (reactions not documented)   "             Medications  Valid as of: June 20, 2017 -  9:51 AM    Generic Name Brand Name Tablet Size Instructions for use    Diclofenac Sodium (Gel) Diclofenac Sodium 1 % Apply 4 g to skin as directed 3 times a day as needed.        Levothyroxine Sodium (Tab) SYNTHROID 125 MCG Take 125 mcg by mouth every day.        .                 Medicines prescribed today were sent to:     [id number] - [place], [place] - [address] [facility] [address] [facility] [address]    [address] [facility] [address] [place] [address]    Phone: [phone] Fax: [phone]    Open 24 Hours?: No      Medication refill instructions:       If your prescription bottle indicates you have medication refills left, it is not necessary to call your provider’s office. Please contact your pharmacy and they will refill your medication.    If your prescription bottle indicates you do not have any refills left, you may request refills at any time through one of the following ways: The online Press-sense system (except Urgent Care), by calling your provider’s office, or by asking your pharmacy to contact your provider’s office with a refill request. Medication refills are processed only during regular business hours and may not be available until the next business day. Your provider may request additional information or to have a follow-up visit with you prior to refilling your medication.   *Please Note: Medication refills are assigned a new Rx number when refilled electronically. Your pharmacy may indicate that no refills were authorized even though a new prescription for the same medication is available at the pharmacy. Please request the medicine by name with the pharmacy before contacting your provider for a refill.        Referral     A referral request has been sent to our patient care coordination department. Please allow 3-5 business days for us to process this request and contact you either by phone or mail. If you do not hear from us by  the 5th business day, please call us at [phone].           Wavestream Access Code: Activation code not generated  Current Wavestream Status: Active          Quit Tobacco Information     Do you want to quit using tobacco?    Quitting tobacco decreases risks of cancer, heart and lung disease, increases life expectancy, improves sense of taste and smell, and increases spending money, among other benefits.    If you are thinking about quitting, we can help.  • [facility] Quit Tobacco Program: 863.657.9671  o Program occurs weekly for four weeks and includes pharmacist consultation on products to support quitting smoking or chewing tobacco. A provider referral is needed for pharmacist consultation.  • Tobacco Users Help Hotline: 1-800-QUIT-NOW (344-0814) or https://[place].quitlogix.org/  o Free, confidential telephone and online coaching for [place] residents. Sessions are designed on a schedule that is convenient for you. Eligible clients receive free nicotine replacement therapy.  • Nationally: www.smokefree.gov  o Information and professional assistance to support both immediate and long-term needs as you become, and remain, a non-smoker. Smokefree.gov allows you to choose the help that best fits your needs.

## 2017-07-04 ENCOUNTER — OCCUPATIONAL MEDICINE (OUTPATIENT)
Dept: URGENT CARE | Facility: PHYSICIAN GROUP | Age: 62
End: 2017-07-04
Payer: COMMERCIAL

## 2017-07-04 VITALS
HEIGHT: 71 IN | BODY MASS INDEX: 26.74 KG/M2 | TEMPERATURE: 98.1 F | WEIGHT: 191 LBS | SYSTOLIC BLOOD PRESSURE: 132 MMHG | RESPIRATION RATE: 18 BRPM | OXYGEN SATURATION: 97 % | DIASTOLIC BLOOD PRESSURE: 80 MMHG | HEART RATE: 60 BPM

## 2017-07-04 DIAGNOSIS — M65.4 DE QUERVAIN'S TENOSYNOVITIS, LEFT: ICD-10-CM

## 2017-07-04 DIAGNOSIS — S66.912A STRAIN OF LEFT WRIST, INITIAL ENCOUNTER: ICD-10-CM

## 2017-07-04 PROCEDURE — 99214 OFFICE O/P EST MOD 30 MIN: CPT | Performed by: PHYSICIAN ASSISTANT

## 2017-07-04 NOTE — Clinical Note
[facility] [address] [place] [address]  Phone: [phone] - Fax: [phone]        [facility] Progress Report and Disability Certification  Date of Service: 7/4/2017   No Show:  No  Date / Time of Next Visit: 7/18/2017   Claim Information   Patient Name: Wagner Lantigua  Claim Number:     Employer: [employer]  Date of Injury: 5/22/2017     Insurer / TPA: Luis E  ID / SSN:     Occupation: carpet tech  Diagnosis: Diagnoses of De Quervain's tenosynovitis, left and Strain of left wrist, initial encounter were pertinent to this visit.    Medical Information   Related to Industrial Injury? Yes    Subjective Complaints:  Continued left wrist pain, left thumb pain, occasional tingling sensation   Objective Findings: Extremities: no clubbing, cyanosis, or edema. Left wrist is without any visual deformity, erythema, edema or ecchymosis. He has reduced range of motion of his thumb, tenderness to palpation at the base of his thumb. Good distal circulation At this time.   Pre-Existing Condition(s):     Assessment:   Initial Visit    Status: Additional Care Required  Comments:referred to occupational health  Permanent Disability:No    Plan: Medication    Diagnostics:      Comments:       Disability Information   Status: Released to Full Duty    From:  7/4/2017  Through: 7/18/2017 Restrictions are: Temporary   Physical Restrictions   Sitting:    Standing:    Stooping:    Bending:      Squatting:    Walking:    Climbing:    Pushing:      Pulling:    Other:    Reaching Above Shoulder (L):   Reaching Above Shoulder (R):       Reaching Below Shoulder (L):    Reaching Below Shoulder (R):      Not to exceed Weight Limits   Carrying(hrs):   Weight Limit(lb):   Lifting(hrs):   Weight  Limit(lb):     Comments:      Repetitive Actions   Hands: i.e. Fine Manipulations from Grasping:     Feet: i.e. Operating Foot Controls:     Driving / Operate Machinery:        Physician Name: Martha Quintero PA-C Physician Signature: MARTHA Carreon PA-C e-Signature: Dr. Kyle Bishop, Medical Director   Clinic Name / Location: [address] [address] Clinic Phone Number: Dept: [phone]   Appointment Time: 9:30 Am Visit Start Time: 9:28 AM   Check-In Time:  9:24 Am Visit Discharge Time:  9:43 am   Original-Treating Physician or Chiropractor    Page 2-Insurer/TPA    Page 3-Employer    Page 4-Employee

## 2017-07-04 NOTE — PROGRESS NOTES
Chief Complaint   Patient presents with   • Follow-Up     WC FV  left wrist- not getting better       HISTORY OF PRESENT ILLNESS: Patient is a 62 y.o. male who presents today because he is following up on a work comp injury.    DOI: 6/5/17. Patient was lifting a couch with his left hand when he had immediate pain in his left wrist. This is his fourth visit to urgent care. He originally was wearing a thumb spica splint. He has been using over-the-counter anti-inflammatories, ice, the wrist splint. He was referred to physical therapy at his last visit. He has not received any word from physical therapy. He continues to have pain and sometimes numbness and tingling in his forearm and hand. He has been working without restrictions but notes that he rarely uses his left hand    There are no active problems to display for this patient.      Allergies:Cortizone    Current Outpatient Prescriptions Ordered in [facility]   Medication Sig Dispense Refill   • Diclofenac Sodium (VOLTAREN) 1 % Gel Apply 4 g to skin as directed 3 times a day as needed. 1 Tube 0   • levothyroxine (SYNTHROID) 125 MCG TABS Take 125 mcg by mouth every day.       No current [facility]-ordered facility-administered medications on file.       Past Medical History   Diagnosis Date   • Thyroid disease        Social History   Substance Use Topics   • Smoking status: Light Tobacco Smoker -- 0.25 packs/day     Types: Cigars   • Smokeless tobacco: Never Used   • Alcohol Use: 0.6 oz/week     1 Shots of liquor per week       No family status information on file.   History reviewed. No pertinent family history.    ROS:  Review of Systems   Constitutional: Negative for fever, chills, weight loss and malaise/fatigue.   HENT: Negative for ear pain, nosebleeds, congestion, sore throat and neck pain.    Eyes: Negative for blurred vision.   Respiratory: Negative for cough, sputum production, shortness of breath and wheezing.    Cardiovascular: Negative for chest pain,  "palpitations, orthopnea and leg swelling.        Exam:  Blood pressure 132/80, pulse 60, temperature 36.7 °C (98.1 °F), resp. rate 18, height 1.803 m (5' 11\"), weight 86.637 kg (191 lb), SpO2 97 %.  General:  Well nourished, well developed male in NAD  Head:Normocephalic, atraumatic  Eyes: PERRLA, EOM within normal limits, no conjunctival injection, no scleral icterus, visual fields and acuity grossly intact.  Extremities: no clubbing, cyanosis, or edema. Left wrist is without any visual deformity, erythema, edema or ecchymosis. He has reduced range of motion of his thumb, tenderness to palpation at the base of his thumb. Good distal circulation At this time.    Please note that this dictation was created using voice recognition software. I have made every reasonable attempt to correct obvious errors, but I expect that there are errors of grammar and possibly content that I did not discover before finalizing the note.    Assessment/Plan:  1. De Quervain's tenosynovitis, left     2. Strain of left wrist, initial encounter  Diclofenac Sodium (VOLTAREN) 1 % Gel    patient not improving, fourth visit in urgent care, will refer to occupational health for further evaluation and treatment.    Followup with primary care in the next 7-10 days if not significantly improving, return to the urgent care or go to the emergency room sooner for any worsening of symptoms.         "

## 2017-07-10 ENCOUNTER — APPOINTMENT (OUTPATIENT)
Dept: RADIOLOGY | Facility: IMAGING CENTER | Age: 62
End: 2017-07-10
Attending: PREVENTIVE MEDICINE
Payer: COMMERCIAL

## 2017-07-10 ENCOUNTER — OCCUPATIONAL MEDICINE (OUTPATIENT)
Dept: OCCUPATIONAL MEDICINE | Facility: CLINIC | Age: 62
End: 2017-07-10
Payer: COMMERCIAL

## 2017-07-10 VITALS
OXYGEN SATURATION: 96 % | DIASTOLIC BLOOD PRESSURE: 78 MMHG | SYSTOLIC BLOOD PRESSURE: 122 MMHG | TEMPERATURE: 98.7 F | BODY MASS INDEX: 26.46 KG/M2 | WEIGHT: 189 LBS | HEART RATE: 76 BPM | RESPIRATION RATE: 16 BRPM | HEIGHT: 71 IN

## 2017-07-10 DIAGNOSIS — S66.912A STRAIN OF LEFT WRIST, INITIAL ENCOUNTER: ICD-10-CM

## 2017-07-10 PROCEDURE — 99204 OFFICE O/P NEW MOD 45 MIN: CPT | Performed by: PREVENTIVE MEDICINE

## 2017-07-10 PROCEDURE — 73110 X-RAY EXAM OF WRIST: CPT | Mod: 26,LT | Performed by: FAMILY MEDICINE

## 2017-07-10 RX ORDER — PREDNISONE 20 MG/1
40 TABLET ORAL DAILY
Qty: 14 TAB | Refills: 0 | Status: SHIPPED | OUTPATIENT
Start: 2017-07-10 | End: 2017-07-17

## 2017-07-10 NOTE — Clinical Note
[address],   [address] [address]  Phone: [phone] - Fax: [phone]        Occupational Health [facility] Progress Report and Disability Certification  Date of Service: 7/10/2017   No Show:  No  Date / Time of Next Visit: 8/1/2017 @ 8:30am   Claim Information   Patient Name: Wagner Lantigua  Claim Number:     Employer: [employer]  Date of Injury: 5/22/2017     Insurer / TPA: Luis E  ID / SSN:     Occupation: carpet tech  Diagnosis: The encounter diagnosis was Strain of left wrist, initial encounter.    Medical Information   Related to Industrial Injury?   Comments:indeterminate    Subjective Complaints:  DOI 5/22: He was lifting a couch with left hand and suddenly felt pain afterward in the left wrist, later that work duties and fell and landed on his left wrist. Seen in urgent care ×4, no x-rays were taken. He has tried thumb spica splint, ibuprofen and rest without relief. He states his job is decently flexible so he has been working full duty without difficulty. He states the pain is at the base of the thumb and worsen with gripping, lifting or using his thumb. He states that occasionally he gets numbness and tenderness sensation from the wrist up the arm.    Objective Findings: Left wrist: Very minimal swelling radial wrist. Tenderness to palpation radial styloid and diffusely along radial wrist. Full range of motion.  strength intact. Resisted thumb movements elicit pain, slight weakness with abduction and adduction. Finkelstein's positive. Phalen's negative. Tinel's negative.   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  Prescribed prednisone 40 mg ×7 days, do not use ibuprofen or naproxen while taking this medication  May use voltaren gel as prescribed  Begin  physical therapy  May continue full duty  Follow-up 3 w  eeks, if no improvement will consider referral to hand surgery    Disability Information   Status: Released to Full Duty    From:  7/10/2017  Through: 8/1/2017 Restrictions are: Temporary   Physical Restrictions   Sitting:    Standing:    Stooping:    Bending:      Squatting:    Walking:    Climbing:    Pushing:      Pulling:    Other:    Reaching Above Shoulder (L):   Reaching Above Shoulder (R):       Reaching Below Shoulder (L):    Reaching Below Shoulder (R):      Not to exceed Weight Limits   Carrying(hrs):   Weight Limit(lb):   Lifting(hrs):   Weight  Limit(lb):     Comments:      Repetitive Actions   Hands: i.e. Fine Manipulations from Grasping:     Feet: i.e. Operating Foot Controls:     Driving / Operate Machinery:     Physician Name: Willi Flores D.O. Physician Signature: WILLI Collins D.O. e-Signature: Dr. Kyle Bishop, Medical Director   Clinic Name / Location: [address],   [address] [place] [address] Clinic Phone Number: Dept: [phone]   Appointment Time: 8:00 Am Visit Start Time: 8:04 AM   Check-In Time:  7:51 Am Visit Discharge Time:  8:48am   Original-Treating Physician or Chiropractor    Page 2-Insurer/TPA    Page 3-Employer    Page 4-Employee

## 2017-07-10 NOTE — PROGRESS NOTES
"Subjective:      Wagner Lantigua is a 62 y.o. male who presents with Follow-Up      DOI 5/22: He was lifting a couch with left hand and suddenly felt pain afterward in the left wrist, later that work duties and fell and landed on his left wrist. Seen in urgent care ×4, no x-rays were taken. He has tried thumb spica splint, ibuprofen and rest without relief. He states his job is decently flexible so he has been working full duty without difficulty. He states the pain is at the base of the thumb and worsen with gripping, lifting or using his thumb. He states that occasionally he gets numbness and tenderness sensation from the wrist up the arm.      HPI    ROS  ROS: All systems were reviewed on intake form, form was reviewed and signed. See scanned documents in media. Pertinent positives and negatives included in HPI.    PMH: No pertinent past medical history to this problem  MEDS: Medications were reviewed in Epic  ALLERGIES:   Allergies   Allergen Reactions   • Cortizone      SOCHX: Works as  at [employer]   FH: No pertinent family history to this problem      Objective:     /78 mmHg  Pulse 76  Temp(Src) 37.1 °C (98.7 °F)  Resp 16  Ht 1.803 m (5' 10.98\")  Wt 85.73 kg (189 lb)  BMI 26.37 kg/m2  SpO2 96%     Physical Exam   Constitutional: He is oriented to person, place, and time. He appears well-developed and well-nourished.   HENT:   Right Ear: External ear normal.   Left Ear: External ear normal.   Eyes: Conjunctivae and EOM are normal.   Cardiovascular: Normal rate.    Pulmonary/Chest: Effort normal. No respiratory distress.   Neurological: He is alert and oriented to person, place, and time.   Skin: Skin is warm and dry.   Psychiatric: He has a normal mood and affect. Judgment normal.   Vitals reviewed.      Left wrist: Very minimal swelling radial wrist. Tenderness to palpation radial styloid and diffusely along radial wrist. Full range of motion.  strength intact. Resisted thumb " movements elicit pain, slight weakness with abduction and adduction. Finkelstein's positive. Phalen's negative. Tinel's negative.       Assessment/Plan:     1. Strain of left wrist, initial encounter  - DX-WRIST-COMPLETE 3+ LEFT; Future    XR Left Wrist: No acute fracture identified  Prescribed prednisone 40 mg ×7 days, do not use ibuprofen or naproxen while taking this medication   May use voltaren gel as prescribed   Begin physical therapy   May continue full duty   Follow-up 3 weeks, if no improvement will consider referral to hand surgery

## 2017-07-10 NOTE — MR AVS SNAPSHOT
"        Wagner Lantigua   7/10/2017 8:00 AM   Occupational Medicine   MRN: [id number]    Department:  [facility]   Dept Phone:  [phone]    Description:  Male : 1955   Provider:  Willi Folres D.O.           Reason for Visit     Follow-Up WC Lt wrist, thumb Still feeling the same.      Allergies as of 7/10/2017     Allergen Noted Reactions    Cortizone 2014         You were diagnosed with     Strain of left wrist, initial encounter   [[id number]]         Vital Signs     Blood Pressure Pulse Temperature Respirations Height Weight    122/78 mmHg 76 37.1 °C (98.7 °F) 16 1.803 m (5' 10.98\") 85.73 kg (189 lb)    Body Mass Index Oxygen Saturation Smoking Status             26.37 kg/m2 96% Light Tobacco Smoker         Basic Information     Date Of Birth Sex Race Ethnicity Preferred Language    1955 Male White Non- English      Your appointments     Aug 01, 2017  8:30 AM   Workers Compensation with Willi Flores D.O.   [address] [address]   [phone]              Health Maintenance        Date Due Completion Dates    IMM DTaP/Tdap/Td Vaccine (1 - Tdap) 1974 ---    COLONOSCOPY 2005 ---    IMM ZOSTER VACCINE 2015 ---    IMM INFLUENZA (1) 2017 ---            Current Immunizations     No immunizations on file.      Below and/or attached are the medications your provider expects you to take. Review all of your home medications and newly ordered medications with your provider and/or pharmacist. Follow medication instructions as directed by your provider and/or pharmacist. Please keep your medication list with you and share with your provider. Update the information when medications are discontinued, doses are changed, or new medications (including over-the-counter products) are added; and carry medication information at all times in the event of emergency situations     Allergies:  CORTIZONE - (reactions not " documented)               Medications  Valid as of: July 10, 2017 -  8:50 AM    Generic Name Brand Name Tablet Size Instructions for use    Diclofenac Sodium (Gel) Diclofenac Sodium 1 % Apply 4 g to skin as directed 3 times a day as needed.        Levothyroxine Sodium (Tab) SYNTHROID 125 MCG Take 125 mcg by mouth every day.        PredniSONE (Tab) DELTASONE 20 MG Take 2 Tabs by mouth every day for 7 days.        .                 Medicines prescribed today were sent to:     [id number] - [place], [place] - [address] [place]    [address] [facility] [place] [address]    Phone: [phone] Fax: [phone]    Open 24 Hours?: No      Medication refill instructions:       If your prescription bottle indicates you have medication refills left, it is not necessary to call your provider’s office. Please contact your pharmacy and they will refill your medication.    If your prescription bottle indicates you do not have any refills left, you may request refills at any time through one of the following ways: The online [employer] system (except Urgent Care), by calling your provider’s office, or by asking your pharmacy to contact your provider’s office with a refill request. Medication refills are processed only during regular business hours and may not be available until the next business day. Your provider may request additional information or to have a follow-up visit with you prior to refilling your medication.   *Please Note: Medication refills are assigned a new Rx number when refilled electronically. Your pharmacy may indicate that no refills were authorized even though a new prescription for the same medication is available at the pharmacy. Please request the medicine by name with the pharmacy before contacting your provider for a refill.        Your To Do List     Future Labs/Procedures Complete By Expires    DX-WRIST-COMPLETE 3+ LEFT  As directed 7/10/2018          [employer] Access Code: [email or web address]  Expires: 8/3/2017  9:23 AM    [employer]  A secure, online tool to manage your health information     [employer]’s [employer]® is a secure, online tool that connects you to your personalized health information from the privacy of your home -- day or night - making it very easy for you to manage your healthcare. Once the activation process is completed, you can even access your medical information using the [employer] iva, which is available for free in the Apple Iva store or Google Play store.     [employer] provides the following levels of access (as shown below):   My Chart Features   [facility] Primary Care Doctor [facility]  Specialists [facility]  Urgent  Care Non-[facility]  Primary Care  Doctor   Email your healthcare team securely and privately 24/7 X X X    Manage appointments: schedule your next appointment; view details of past/upcoming appointments X      Request prescription refills. X      View recent personal medical records, including lab and immunizations X X X X   View health record, including health history, allergies, medications X X X X   Read reports about your outpatient visits, procedures, consult and ER notes X X X X   See your discharge summary, which is a recap of your hospital and/or ER visit that includes your diagnosis, lab results, and care plan. X X       How to register for [employer]:  1. Go to  [email or web address].  2. Click on the Sign Up Now box, which takes you to the New Member Sign Up page. You will need to provide the following information:  a. Enter your N[employer] Media Access Code exactly as it appears at the top of this page. (You will not need to use this code after you’ve completed the sign-up process. If you do not sign up before the expiration date, you must request a new code.)   b. Enter your date of birth.   c. Enter your home email address.   d. Click Submit, and follow the next screen’s instructions.  3. Create a Namo Media ID. This will be your [employer] Media  login ID and cannot be changed, so think of one that is secure and easy to remember.  4. Create a [employer]ia password. You can change your password at any time.  5. Enter your Password Reset Question and Answer. This can be used at a later time if you forget your password.   6. Enter your e-mail address. This allows you to receive e-mail notifications when new information is available in [employer].  7. Click Sign Up. You can now view your health information.    For assistance activating your [employer] account, call [phone]        Quit Tobacco Information     Do you want to quit using tobacco?    Quitting tobacco decreases risks of cancer, heart and lung disease, increases life expectancy, improves sense of taste and smell, and increases spending money, among other benefits.    If you are thinking about quitting, we can help.  • [facility] Quit Tobacco Program: 149.423.8416  o Program occurs weekly for four weeks and includes pharmacist consultation on products to support quitting smoking or chewing tobacco. A provider referral is needed for pharmacist consultation.  • Tobacco Users Help Hotline: 9-478-QUIT-NOW (069-6080) or https://[place].quitlogix.org/  o Free, confidential telephone and online coaching for [place] residents. Sessions are designed on a schedule that is convenient for you. Eligible clients receive free nicotine replacement therapy.  • Nationally: www.smokefree.gov  o Information and professional assistance to support both immediate and long-term needs as you become, and remain, a non-smoker. Smokefree.gov allows you to choose the help that best fits your needs.

## 2017-08-01 ENCOUNTER — OCCUPATIONAL MEDICINE (OUTPATIENT)
Dept: OCCUPATIONAL MEDICINE | Facility: CLINIC | Age: 62
End: 2017-08-01
Payer: COMMERCIAL

## 2017-08-01 VITALS
RESPIRATION RATE: 12 BRPM | DIASTOLIC BLOOD PRESSURE: 72 MMHG | BODY MASS INDEX: 26.74 KG/M2 | HEIGHT: 71 IN | OXYGEN SATURATION: 97 % | TEMPERATURE: 98.6 F | SYSTOLIC BLOOD PRESSURE: 120 MMHG | WEIGHT: 191 LBS | HEART RATE: 70 BPM

## 2017-08-01 DIAGNOSIS — S66.912A STRAIN OF LEFT WRIST, INITIAL ENCOUNTER: ICD-10-CM

## 2017-08-01 PROCEDURE — 99213 OFFICE O/P EST LOW 20 MIN: CPT | Performed by: PREVENTIVE MEDICINE

## 2017-08-01 ASSESSMENT — ENCOUNTER SYMPTOMS
FEVER: 0
TINGLING: 1
FOCAL WEAKNESS: 0
CHILLS: 0

## 2017-08-01 ASSESSMENT — PAIN SCALES - GENERAL: PAINLEVEL: 4=SLIGHT-MODERATE PAIN

## 2017-08-01 NOTE — PROGRESS NOTES
"Subjective:      Wagner Lantigua is a 62 y.o. male who presents with Follow-Up      DOI 5/22: He was lifting a couch with left hand and suddenly felt pain afterward in the left wrist, later that work duties and fell and landed on his left wrist. Patient states that his symptoms are unchanged. He continues to have pain in the left wrist. He has pain with gripping and lifting objects. Notes occasional numbness tingling of the arm. Pain continues and the atrial aspect. He has not been able to do any physical therapy yet because of his work schedule. Has not had any relief with any other medications.     HPI    Review of Systems   Constitutional: Negative for fever and chills.   Neurological: Positive for tingling. Negative for focal weakness.     SOCHX: Works as a carpet tech at [employer]   FH: No pertinent family history to this problem.         Objective:     /72 mmHg  Pulse 70  Temp(Src) 37 °C (98.6 °F)  Resp 12  Ht 1.803 m (5' 11\")  Wt 86.637 kg (191 lb)  BMI 26.65 kg/m2  SpO2 97%     Physical Exam   Constitutional: He is oriented to person, place, and time. He appears well-developed and well-nourished.   Cardiovascular: Normal rate.    Pulmonary/Chest: Effort normal.   Neurological: He is alert and oriented to person, place, and time.   Skin: Skin is warm and dry.   Psychiatric: He has a normal mood and affect. Judgment normal.       Left wrist: Very minimal swelling radial wrist. Tenderness to palpation radial styloid and diffusely along radial wrist. Full range of motion.  strength intact. Resisted thumb movements elicit pain, slight weakness with abduction and adduction. Finkelstein's positive. Phalen's negative. Tinel's negative       Assessment/Plan:     1. Strain of left wrist, initial encounter  Referral to hand surgery  Begin physical therapy if able  Ibuprofen or Aleve as needed  May continue full duty  Follow-up 3 weeks        "

## 2017-08-01 NOTE — Clinical Note
[address],   [address] [address]  Phone: [phone] - Fax: [phone]        Occupational Health [facility] Progress Report and Disability Certification  Date of Service: 8/1/2017   No Show:  No  Date / Time of Next Visit: 8/22/2017@8:20AM    Claim Information   Patient Name: Wagner Lantigua  Claim Number:     Employer: [employer]  Date of Injury: 5/22/2017     Insurer / TPA: Luis E  ID / SSN:     Occupation: carpet tech  Diagnosis: The encounter diagnosis was Strain of left wrist, initial encounter.    Medical Information   Related to Industrial Injury?   Comments:indeterminate    Subjective Complaints:  DOI 5/22: He was lifting a couch with left hand and suddenly felt pain afterward in the left wrist, later that work duties and fell and landed on his left wrist. Patient states that his symptoms are unchanged. He continues to have pain in the left wrist. He has pain with gripping and lifting objects. Notes occasional numbness tingling of the arm. Pain continues and the atrial aspect. He has not been able to do any physical therapy yet because of his work schedule. Has not had any relief with any other medications.   Objective Findings: Left wrist: Very minimal swelling radial wrist. Tenderness to palpation radial styloid and diffusely along radial wrist. Full range of motion.  strength intact. Resisted thumb movements elicit pain, slight weakness with abduction and adduction. Finkelstein's positive. Phalen's negative. Tinel's negative   Pre-Existing Condition(s):     Assessment:   Condition Same    Status: Additional Care Required  Permanent Disability:No    Plan:      Diagnostics:      Comments:  Referral to hand surgery  Begin physical therapy if able  Ibuprofen or Aleve as needed  May continue full duty  Follow-up 3 weeks    Disability Information   Status: Released to Full Duty    From:  8/1/2017  Through: 8/22/2017 Restrictions are:        Physical Restrictions   Sitting:    Standing:    Stooping:    Bending:      Squatting:    Walking:    Climbing:    Pushing:      Pulling:    Other:    Reaching Above Shoulder (L):   Reaching Above Shoulder (R):       Reaching Below Shoulder (L):    Reaching Below Shoulder (R):      Not to exceed Weight Limits   Carrying(hrs):   Weight Limit(lb):   Lifting(hrs):   Weight  Limit(lb):     Comments:      Repetitive Actions   Hands: i.e. Fine Manipulations from Grasping:     Feet: i.e. Operating Foot Controls:     Driving / Operate Machinery:     Physician Name: Willi Flores D.O. Physician Signature: WILLI Collins D.O. e-Signature: Dr. Kyle Bishop, Medical Director   Clinic Name / Location: [address],   [address] [place] [address] Clinic Phone Number: Dept: [phone]   Appointment Time: 8:30 Am Visit Start Time: 8:33 AM   Check-In Time:  8:31 Am Visit Discharge Time:  @9:12AM    Original-Treating Physician or Chiropractor    Page 2-Insurer/TPA    Page 3-Employer    Page 4-Employee

## 2017-08-01 NOTE — MR AVS SNAPSHOT
"        Wagner Lantigua   2017 8:30 AM   Occupational Medicine   MRN: [id number]    Department:  [facility]   Dept Phone:  [phone]    Description:  Male : 1955   Provider:  Willi Flores D.O.           Reason for Visit     Follow-Up WC FV- DOI 17 Lt Wrist- same as last visit- not getting better       Allergies as of 2017     Allergen Noted Reactions    Cortizone 2014         You were diagnosed with     Strain of left wrist, initial encounter   [[id number]]         Vital Signs     Blood Pressure Pulse Temperature Respirations Height Weight    120/72 mmHg 70 37 °C (98.6 °F) 12 1.803 m (5' 11\") 86.637 kg (191 lb)    Body Mass Index Oxygen Saturation Smoking Status             26.65 kg/m2 97% Light Tobacco Smoker         Basic Information     Date Of Birth Sex Race Ethnicity Preferred Language    1955 Male White Non- English      Your appointments     Aug 22, 2017  8:20 AM   Workers Compensation with Willi Flores D.O.   [address] [address]   [phone]              Health Maintenance        Date Due Completion Dates    IMM DTaP/Tdap/Td Vaccine (1 - Tdap) 1974 ---    COLONOSCOPY 2005 ---    IMM ZOSTER VACCINE 2015 ---    IMM INFLUENZA (1) 2017 ---            Current Immunizations     No immunizations on file.      Below and/or attached are the medications your provider expects you to take. Review all of your home medications and newly ordered medications with your provider and/or pharmacist. Follow medication instructions as directed by your provider and/or pharmacist. Please keep your medication list with you and share with your provider. Update the information when medications are discontinued, doses are changed, or new medications (including over-the-counter products) are added; and carry medication information at all times in the event of emergency situations     Allergies:  CORTIZONE " - (reactions not documented)               Medications  Valid as of: August 01, 2017 - 11:26 AM    Generic Name Brand Name Tablet Size Instructions for use    Diclofenac Sodium (Gel) Diclofenac Sodium 1 % Apply 4 g to skin as directed 3 times a day as needed.        Levothyroxine Sodium (Tab) SYNTHROID 125 MCG Take 125 mcg by mouth every day.        .                 Medicines prescribed today were sent to:     [id number] - [place], [place] - [address] [facility] [address] [facility] [address]    [address] [facility] [address] [place] [address]    Phone: [phone] Fax: [phone]    Open 24 Hours?: No      Medication refill instructions:       If your prescription bottle indicates you have medication refills left, it is not necessary to call your provider’s office. Please contact your pharmacy and they will refill your medication.    If your prescription bottle indicates you do not have any refills left, you may request refills at any time through one of the following ways: The online F?rsat Bu F?rsat system (except Urgent Care), by calling your provider’s office, or by asking your pharmacy to contact your provider’s office with a refill request. Medication refills are processed only during regular business hours and may not be available until the next business day. Your provider may request additional information or to have a follow-up visit with you prior to refilling your medication.   *Please Note: Medication refills are assigned a new Rx number when refilled electronically. Your pharmacy may indicate that no refills were authorized even though a new prescription for the same medication is available at the pharmacy. Please request the medicine by name with the pharmacy before contacting your provider for a refill.        Referral     A referral request has been sent to our patient care coordination department. Please allow 3-5 business days for us to process this request and contact you either by phone or  mail. If you do not hear from us by the 5th business day, please call us at [phone].           Language Logistics Access Code: [email or web address]  Expires: 8/3/2017  9:23 AM    Language Logistics  A secure, online tool to manage your health information     [email or web address] Language Logistics® is a secure, online tool that connects you to your personalized health information from the privacy of your home -- day or night - making it very easy for you to manage your healthcare. Once the activation process is completed, you can even access your medical information using the Language Logistics iva, which is available for free in the Apple Iva store or Google Play store.     Language Logistics provides the following levels of access (as shown below):   My Chart Features   [facility] Doctor [facility]  Specialists [facility]  Urgent  Care Non-[facility]  Primary Care  Doctor   Email your healthcare team securely and privately 24/7 X X X    Manage appointments: schedule your next appointment; view details of past/upcoming appointments X      Request prescription refills. X      View recent personal medical records, including lab and immunizations X X X X   View health record, including health history, allergies, medications X X X X   Read reports about your outpatient visits, procedures, consult and ER notes X X X X   See your discharge summary, which is a recap of your hospital and/or ER visit that includes your diagnosis, lab results, and care plan. X X       How to register for Language Logistics:  1. Go to  [email or web address].  2. Click on the Sign Up Now box, which takes you to the New Member Sign Up page. You will need to provide the following information:  a. Enter your Language Logistics Access Code exactly as it appears at the top of this page. (You will not need to use this code after you’ve completed the sign-up process. If you do not sign up before the expiration date, you must request a new code.)   b. Enter your date of birth.   c. Enter your home email address.   d. Click  Submit, and follow the next screen’s instructions.  3. Create a Datalogixt ID. This will be your [employer] login ID and cannot be changed, so think of one that is secure and easy to remember.  4. Create a Datalogixt password. You can change your password at any time.  5. Enter your Password Reset Question and Answer. This can be used at a later time if you forget your password.   6. Enter your e-mail address. This allows you to receive e-mail notifications when new information is available in [employer].  7. Click Sign Up. You can now view your health information.    For assistance activating your [employer] account, call [phone]        Quit Tobacco Information     Do you want to quit using tobacco?    Quitting tobacco decreases risks of cancer, heart and lung disease, increases life expectancy, improves sense of taste and smell, and increases spending money, among other benefits.    If you are thinking about quitting, we can help.  • [facility] Quit Tobacco Program: 978.119.6723  o Program occurs weekly for four weeks and includes pharmacist consultation on products to support quitting smoking or chewing tobacco. A provider referral is needed for pharmacist consultation.  • Tobacco Users Help Hotline: 6-626-QUIT-NOW (813-8062) or https://[place].quitlogix.org/  o Free, confidential telephone and online coaching for [place] residents. Sessions are designed on a schedule that is convenient for you. Eligible clients receive free nicotine replacement therapy.  • Nationally: www.smokefree.gov  o Information and professional assistance to support both immediate and long-term needs as you become, and remain, a non-smoker. Smokefree.gov allows you to choose the help that best fits your needs.

## 2017-08-22 ENCOUNTER — OCCUPATIONAL MEDICINE (OUTPATIENT)
Dept: OCCUPATIONAL MEDICINE | Facility: CLINIC | Age: 62
End: 2017-08-22
Payer: COMMERCIAL

## 2017-08-22 VITALS
SYSTOLIC BLOOD PRESSURE: 150 MMHG | BODY MASS INDEX: 25.9 KG/M2 | RESPIRATION RATE: 12 BRPM | TEMPERATURE: 98.1 F | HEIGHT: 71 IN | WEIGHT: 185 LBS | OXYGEN SATURATION: 96 % | HEART RATE: 69 BPM | DIASTOLIC BLOOD PRESSURE: 98 MMHG

## 2017-08-22 DIAGNOSIS — S66.912A STRAIN OF LEFT WRIST, INITIAL ENCOUNTER: ICD-10-CM

## 2017-08-22 PROCEDURE — 99213 OFFICE O/P EST LOW 20 MIN: CPT | Performed by: PREVENTIVE MEDICINE

## 2017-08-22 ASSESSMENT — ENCOUNTER SYMPTOMS
FOCAL WEAKNESS: 1
TINGLING: 1

## 2017-08-22 ASSESSMENT — PAIN SCALES - GENERAL: PAINLEVEL: 8=MODERATE-SEVERE PAIN

## 2017-08-22 NOTE — MR AVS SNAPSHOT
"        Wagner Lantigua   2017 8:20 AM   Occupational Medicine   MRN: [id number]    Department:  [facility]   Dept Phone:  [phone]    Description:  Male : 1955   Provider:  Willi Flores D.O.           Reason for Visit     Follow-Up WC DOI 2017 - L Wrist - Same - ROOM 3      Allergies as of 2017     Allergen Noted Reactions    Cortizone 2014         You were diagnosed with     Strain of left wrist, initial encounter   [[id number]]         Vital Signs     Blood Pressure Pulse Temperature Respirations Height Weight    150/98 mmHg 69 36.7 °C (98.1 °F) 12 1.803 m (5' 10.98\") 83.915 kg (185 lb)    Body Mass Index Oxygen Saturation Smoking Status             25.81 kg/m2 96% Light Tobacco Smoker         Basic Information     Date Of Birth Sex Race Ethnicity Preferred Language    1955 Male White Non- English      Your appointments     Sep 19, 2017  8:40 AM   Workers Compensation with Willi Flores D.O.   [address] [address]   [phone]              Health Maintenance        Date Due Completion Dates    IMM DTaP/Tdap/Td Vaccine (1 - Tdap) 1974 ---    COLONOSCOPY 2005 ---    IMM ZOSTER VACCINE 2015 ---    IMM INFLUENZA (1) 2017 ---            Current Immunizations     No immunizations on file.      Below and/or attached are the medications your provider expects you to take. Review all of your home medications and newly ordered medications with your provider and/or pharmacist. Follow medication instructions as directed by your provider and/or pharmacist. Please keep your medication list with you and share with your provider. Update the information when medications are discontinued, doses are changed, or new medications (including over-the-counter products) are added; and carry medication information at all times in the event of emergency situations     Allergies:  CORTIZONE - (reactions not " documented)               Medications  Valid as of: August 22, 2017 - 11:04 AM    Generic Name Brand Name Tablet Size Instructions for use    Diclofenac Sodium (Gel) Diclofenac Sodium 1 % Apply 4 g to skin as directed 3 times a day as needed.        Levothyroxine Sodium (Tab) SYNTHROID 125 MCG Take 125 mcg by mouth every day.        .                 Medicines prescribed today were sent to:     [facility], [place] - [address] [facility] [address] [facility] [address]    [address] [facility] [address] [place] [address]    Phone: [phone] Fax: [phone]    Open 24 Hours?: No      Medication refill instructions:       If your prescription bottle indicates you have medication refills left, it is not necessary to call your provider’s office. Please contact your pharmacy and they will refill your medication.    If your prescription bottle indicates you do not have any refills left, you may request refills at any time through one of the following ways: The online Veezeon system (except Urgent Care), by calling your provider’s office, or by asking your pharmacy to contact your provider’s office with a refill request. Medication refills are processed only during regular business hours and may not be available until the next business day. Your provider may request additional information or to have a follow-up visit with you prior to refilling your medication.   *Please Note: Medication refills are assigned a new Rx number when refilled electronically. Your pharmacy may indicate that no refills were authorized even though a new prescription for the same medication is available at the pharmacy. Please request the medicine by name with the pharmacy before contacting your provider for a refill.           Veezeon Access Code: [email or web address]  Expires: 9/21/2017 11:04 AM    Veezeon  A secure, online tool to manage your health information     [employer]’s Veezeon® is a secure, online tool that connects  you to your personalized health information from the privacy of your home -- day or night - making it very easy for you to manage your healthcare. Once the activation process is completed, you can even access your medical information using the [employer] iva, which is available for free in the Apple Iva store or Google Play store.     [employer] provides the following levels of access (as shown below):   My Chart Features   [facility] Doctor [facility]  Specialists [facility]  Urgent  Care Non-[facility]  Primary Care  Doctor   Email your healthcare team securely and privately 24/7 X X X    Manage appointments: schedule your next appointment; view details of past/upcoming appointments X      Request prescription refills. X      View recent personal medical records, including lab and immunizations X X X X   View health record, including health history, allergies, medications X X X X   Read reports about your outpatient visits, procedures, consult and ER notes X X X X   See your discharge summary, which is a recap of your hospital and/or ER visit that includes your diagnosis, lab results, and care plan. X X       How to register for [employer]:  1. Go to  [email or web address].  2. Click on the Sign Up Now box, which takes you to the New Member Sign Up page. You will need to provide the following information:  a. Enter your [employer] Access Code exactly as it appears at the top of this page. (You will not need to use this code after you’ve completed the sign-up process. If you do not sign up before the expiration date, you must request a new code.)   b. Enter your date of birth.   c. Enter your home email address.   d. Click Submit, and follow the next screen’s instructions.  3. Create a [employer] ID. This will be your [employer] login ID and cannot be changed, so think of one that is secure and easy to remember.  4. Create a [employer] password. You can change your password at any time.  5. Enter your Password Reset Question and  Answer. This can be used at a later time if you forget your password.   6. Enter your e-mail address. This allows you to receive e-mail notifications when new information is available in [employer].  7. Click Sign Up. You can now view your health information.    For assistance activating your [employer] account, call [phone]        Quit Tobacco Information     Do you want to quit using tobacco?    Quitting tobacco decreases risks of cancer, heart and lung disease, increases life expectancy, improves sense of taste and smell, and increases spending money, among other benefits.    If you are thinking about quitting, we can help.  • [facility] Quit [facility]: 677.408.3145  o Program occurs weekly for four weeks and includes pharmacist consultation on products to support quitting smoking or chewing tobacco. A provider referral is needed for pharmacist consultation.  • Tobacco Users Help Hotline: 4-800-QUIT-NOW (353-1500) or https://[place].quitlogix.org/  o Free, confidential telephone and online coaching for [place] residents. Sessions are designed on a schedule that is convenient for you. Eligible clients receive free nicotine replacement therapy.  • Nationally: www.smokefree.gov  o Information and professional assistance to support both immediate and long-term needs as you become, and remain, a non-smoker. Smokefree.gov allows you to choose the help that best fits your needs.

## 2017-08-22 NOTE — Clinical Note
[address],   [address] [place] [address]  Phone: [phone] - Fax: [phone]        [facility] Progress Report and Disability Certification  Date of Service: 8/22/2017   No Show:  No  Date / Time of Next Visit: 9/19/2017@8:40AM    Claim Information   Patient Name: Wagner Lantigua  Claim Number:     Employer: [employer]  Date of Injury: 5/22/2017     Insurer / TPA: Luis E  ID / SSN:     Occupation: carpet tech  Diagnosis: The encounter diagnosis was Strain of left wrist, initial encounter.    Medical Information   Related to Industrial Injury?   Comments:indeterminate    Subjective Complaints:  DOI 5/22/17: He was lifting a couch with left hand and suddenly felt pain afterward in the left wrist, later that work duties and fell and landed on his left wrist. Patient states that his symptoms are unchanged. Patient states that he continues to have left wrist pain on the radial aspect. Pain is worse at night or with gripping or with ulnar deviation. He states that nothing really seems to help calm his chart ibuprofen, but prednisone and wrist splint. He is able to work full duty since he rarely uses his left hand for anything at work. He has not heard back from Worker's Comp. insurance about the status of his referral to hand surgery.   Objective Findings: Left wrist: Some swelling radial wrist. Tenderness to palpation radial styloid and diffusely along radial wrist. Full range of motion. Slight decrease in  strength. Resisted thumb movements elicit pain, slight weakness with abduction and adduction. Finkelstein's positive. Phalen's negative. Tinel's negative   Pre-Existing Condition(s):     Assessment:   Condition Same    Status: Additional Care Required  Permanent Disability:No    Plan:      Diagnostics:      Comments:  Awaiting approval for hand surgery consult   Continue physical therapy  Continue diclofenac gel as needed  Continue  full duty  Follow up one month    Disability Information   Status: Released to Full Duty    From:  8/22/2017  Through: 9/19/2017 Restrictions are:     Physical Restrictions   Sitting:    Standing:    Stooping:    Bending:      Squatting:    Walking:    Climbing:    Pushing:      Pulling:    Other:    Reaching Above Shoulder (L):   Reaching Above Shoulder (R):       Reaching Below Shoulder (L):    Reaching Below Shoulder (R):      Not to exceed Weight Limits   Carrying(hrs):   Weight Limit(lb):   Lifting(hrs):   Weight  Limit(lb):     Comments:      Repetitive Actions   Hands: i.e. Fine Manipulations from Grasping:     Feet: i.e. Operating Foot Controls:     Driving / Operate Machinery:     Physician Name: Willi Flores D.O. Physician Signature: WILLI Collins D.O. e-Signature: Dr. Kyle Bishop, Medical Director   Clinic Name / Location: [address],   [address] [place] [address] Clinic Phone Number: Dept: [phone]   Appointment Time: 8:20 Am Visit Start Time: 8:43 AM   Check-In Time:  8:36 Am Visit Discharge Time:  @9:03AM    Original-Treating Physician or Chiropractor    Page 2-Insurer/TPA    Page 3-Employer    Page 4-Employee

## 2017-08-22 NOTE — PROGRESS NOTES
"Subjective:      Wagner Lantigua is a 62 y.o. male who presents with Follow-Up      DOI 5/22/17: He was lifting a couch with left hand and suddenly felt pain afterward in the left wrist, later that work duties and fell and landed on his left wrist. Patient states that his symptoms are unchanged. Patient states that he continues to have left wrist pain on the radial aspect. Pain is worse at night or with gripping or with ulnar deviation. He states that nothing really seems to help calm his chart ibuprofen, but prednisone and wrist splint. He is able to work full duty since he rarely uses his left hand for anything at work. He has not heard back from Worker's Comp. insurance about the status of his referral to hand surgery.     HPI    Review of Systems   Neurological: Positive for tingling and focal weakness.     SOCHX: Works as a  at [employer]   FH: No pertinent family history to this problem.         Objective:     /98 mmHg  Pulse 69  Temp(Src) 36.7 °C (98.1 °F)  Resp 12  Ht 1.803 m (5' 10.98\")  Wt 83.915 kg (185 lb)  BMI 25.81 kg/m2  SpO2 96%     Physical Exam   Constitutional: He is oriented to person, place, and time. He appears well-developed and well-nourished.   Cardiovascular: Normal rate.    Pulmonary/Chest: Effort normal.   Neurological: He is alert and oriented to person, place, and time.   Skin: Skin is warm and dry.   Psychiatric: He has a normal mood and affect. Judgment normal.       Left wrist: Some swelling radial wrist. Tenderness to palpation radial styloid and diffusely along radial wrist. Full range of motion. Slight decrease in  strength. Resisted thumb movements elicit pain, slight weakness with abduction and adduction. Finkelstein's positive. Phalen's negative. Tinel's negative       Assessment/Plan:     1. Strain of left wrist, initial encounter  Awaiting approval for hand surgery consult   Continue physical therapy  Continue diclofenac gel as " needed  Continue full duty  Follow up one month

## 2021-05-10 ENCOUNTER — OFFICE VISIT (OUTPATIENT)
Dept: URGENT CARE | Facility: PHYSICIAN GROUP | Age: 66
End: 2021-05-10

## 2021-05-10 ENCOUNTER — APPOINTMENT (OUTPATIENT)
Dept: RADIOLOGY | Facility: IMAGING CENTER | Age: 66
End: 2021-05-10
Attending: NURSE PRACTITIONER
Payer: MEDICARE

## 2021-05-10 VITALS
BODY MASS INDEX: 29.12 KG/M2 | OXYGEN SATURATION: 98 % | WEIGHT: 208 LBS | RESPIRATION RATE: 12 BRPM | SYSTOLIC BLOOD PRESSURE: 132 MMHG | DIASTOLIC BLOOD PRESSURE: 88 MMHG | TEMPERATURE: 98.6 F | HEIGHT: 71 IN | HEART RATE: 78 BPM

## 2021-05-10 DIAGNOSIS — S70.12XA CONTUSION OF LEFT HIP AND THIGH, INITIAL ENCOUNTER: ICD-10-CM

## 2021-05-10 DIAGNOSIS — T14.8XXA EXCORIATION: ICD-10-CM

## 2021-05-10 DIAGNOSIS — S79.912A INJURY OF LEFT HIP, INITIAL ENCOUNTER: ICD-10-CM
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
## 2021-05-10 DIAGNOSIS — S70.02XA CONTUSION OF LEFT HIP AND THIGH, INITIAL ENCOUNTER: ICD-10-CM

## 2021-05-10 PROCEDURE — 73501 X-RAY EXAM HIP UNI 1 VIEW: CPT | Mod: TC,FY,LT | Performed by: NURSE PRACTITIONER

## 2021-05-10 PROCEDURE — 99203 OFFICE O/P NEW LOW 30 MIN: CPT | Performed by: NURSE PRACTITIONER

## 2021-05-10 RX ORDER — HYDROCODONE BITARTRATE AND ACETAMINOPHEN 5; 325 MG/1; MG/1
1 TABLET ORAL EVERY 6 HOURS PRN
Qty: 20 TABLET | Refills: 0 | Status: SHIPPED | OUTPATIENT
Start: 2021-05-10 | End: 2021-05-15

## 2021-05-10 RX ORDER — LEVOTHYROXINE SODIUM 0.12 MG/1
TABLET ORAL
COMMUNITY
End: 2021-05-10

## 2021-05-10 RX ORDER — MELOXICAM 15 MG/1
TABLET ORAL
COMMUNITY

## 2021-05-10 ASSESSMENT — ENCOUNTER SYMPTOMS
FOCAL WEAKNESS: 0
DIZZINESS: 0
NECK PAIN: 0
HEADACHES: 0
WEAKNESS: 0
SENSORY CHANGE: 0
CHILLS: 0
FEVER: 0
BACK PAIN: 0
FALLS: 1

## 2021-05-10 ASSESSMENT — PAIN SCALES - GENERAL: PAINLEVEL: 8=MODERATE-SEVERE PAIN
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
## 2021-05-10 NOTE — LETTER
May 10, 2021         Patient: Wagner Lantigua   YOB: 1955   Date of Visit: 5/10/2021           To Whom it May Concern:    Wagner Lantigua was seen in my clinic on 5/10/2021. He may return to work on 07/17/2021  If you have any questions or concerns, please don't hesitate to call.        Sincerely,           WES Serrano.  Electronically Signed

## 2023-10-02 ENCOUNTER — HOSPITAL ENCOUNTER (OUTPATIENT)
Dept: LAB | Facility: MEDICAL CENTER | Age: 68
End: 2023-10-02
Attending: FAMILY MEDICINE
Payer: MEDICARE

## 2023-10-02 LAB
ALBUMIN SERPL BCP-MCNC: 4.3 G/DL (ref 3.2–4.9)
ALBUMIN/GLOB SERPL: 2.3 G/DL
ALP SERPL-CCNC: 123 U/L (ref 30–99)
ALT SERPL-CCNC: 22 U/L (ref 2–50)
ANION GAP SERPL CALC-SCNC: 8 MMOL/L (ref 7–16)
AST SERPL-CCNC: 18 U/L (ref 12–45)
BASOPHILS # BLD AUTO: 0.6 % (ref 0–1.8)
BASOPHILS # BLD: 0.03 K/UL (ref 0–0.12)
BILIRUB SERPL-MCNC: 0.5 MG/DL (ref 0.1–1.5)
BUN SERPL-MCNC: 14 MG/DL (ref 8–22)
CALCIUM ALBUM COR SERPL-MCNC: 8.2 MG/DL (ref 8.5–10.5)
CALCIUM SERPL-MCNC: 8.4 MG/DL (ref 8.5–10.5)
CHLORIDE SERPL-SCNC: 107 MMOL/L (ref 96–112)
CHOLEST SERPL-MCNC: 115 MG/DL (ref 100–199)
CO2 SERPL-SCNC: 25 MMOL/L (ref 20–33)
CREAT SERPL-MCNC: 0.93 MG/DL (ref 0.5–1.4)
EOSINOPHIL # BLD AUTO: 0.18 K/UL (ref 0–0.51)
EOSINOPHIL NFR BLD: 3.5 % (ref 0–6.9)
ERYTHROCYTE [DISTWIDTH] IN BLOOD BY AUTOMATED COUNT: 51.4 FL (ref 35.9–50)
FASTING STATUS PATIENT QL REPORTED: NORMAL
GFR SERPLBLD CREATININE-BSD FMLA CKD-EPI: 89 ML/MIN/1.73 M 2
GLOBULIN SER CALC-MCNC: 1.9 G/DL (ref 1.9–3.5)
GLUCOSE SERPL-MCNC: 96 MG/DL (ref 65–99)
HCT VFR BLD AUTO: 46.6 % (ref 42–52)
HDLC SERPL-MCNC: 24 MG/DL
HGB BLD-MCNC: 16.1 G/DL (ref 14–18)
IMM GRANULOCYTES # BLD AUTO: 0.02 K/UL (ref 0–0.11)
IMM GRANULOCYTES NFR BLD AUTO: 0.4 % (ref 0–0.9)
LDLC SERPL CALC-MCNC: 68 MG/DL
LYMPHOCYTES # BLD AUTO: 1.49 K/UL (ref 1–4.8)
LYMPHOCYTES NFR BLD: 29.1 % (ref 22–41)
MCH RBC QN AUTO: 34.4 PG (ref 27–33)
MCHC RBC AUTO-ENTMCNC: 34.5 G/DL (ref 32.3–36.5)
MCV RBC AUTO: 99.6 FL (ref 81.4–97.8)
MONOCYTES # BLD AUTO: 0.55 K/UL (ref 0–0.85)
MONOCYTES NFR BLD AUTO: 10.7 % (ref 0–13.4)
NEUTROPHILS # BLD AUTO: 2.85 K/UL (ref 1.82–7.42)
NEUTROPHILS NFR BLD: 55.7 % (ref 44–72)
NRBC # BLD AUTO: 0 K/UL
NRBC BLD-RTO: 0 /100 WBC (ref 0–0.2)
PLATELET # BLD AUTO: 247 K/UL (ref 164–446)
PMV BLD AUTO: 10.2 FL (ref 9–12.9)
POTASSIUM SERPL-SCNC: 4.8 MMOL/L (ref 3.6–5.5)
PROT SERPL-MCNC: 6.2 G/DL (ref 6–8.2)
RBC # BLD AUTO: 4.68 M/UL (ref 4.7–6.1)
SODIUM SERPL-SCNC: 140 MMOL/L (ref 135–145)
TRIGL SERPL-MCNC: 116 MG/DL (ref 0–149)
WBC # BLD AUTO: 5.1 K/UL (ref 4.8–10.8)

## 2023-10-02 PROCEDURE — 84153 ASSAY OF PSA TOTAL: CPT | Mod: GA

## 2023-10-02 PROCEDURE — 80061 LIPID PANEL: CPT

## 2023-10-02 PROCEDURE — 36415 COLL VENOUS BLD VENIPUNCTURE: CPT

## 2023-10-02 PROCEDURE — 85025 COMPLETE CBC W/AUTO DIFF WBC: CPT

## 2023-10-02 PROCEDURE — 80053 COMPREHEN METABOLIC PANEL: CPT

## 2023-10-05 LAB
PSA FREE MFR SERPL: NORMAL %
PSA FREE SERPL-MCNC: NORMAL NG/ML
PSA SERPL-MCNC: 1.4 NG/ML (ref 0–4)